# Patient Record
Sex: MALE | Race: WHITE | NOT HISPANIC OR LATINO | Employment: FULL TIME | URBAN - NONMETROPOLITAN AREA
[De-identification: names, ages, dates, MRNs, and addresses within clinical notes are randomized per-mention and may not be internally consistent; named-entity substitution may affect disease eponyms.]

---

## 2024-06-21 ENCOUNTER — APPOINTMENT (OUTPATIENT)
Dept: RADIOLOGY | Facility: CLINIC | Age: 64
End: 2024-06-21
Payer: COMMERCIAL

## 2024-06-21 ENCOUNTER — OFFICE VISIT (OUTPATIENT)
Dept: URGENT CARE | Facility: CLINIC | Age: 64
End: 2024-06-21
Payer: COMMERCIAL

## 2024-06-21 VITALS
DIASTOLIC BLOOD PRESSURE: 78 MMHG | WEIGHT: 315 LBS | HEART RATE: 104 BPM | TEMPERATURE: 97.6 F | RESPIRATION RATE: 20 BRPM | HEIGHT: 75 IN | SYSTOLIC BLOOD PRESSURE: 106 MMHG | BODY MASS INDEX: 39.17 KG/M2 | OXYGEN SATURATION: 96 %

## 2024-06-21 DIAGNOSIS — J40 BRONCHITIS: Primary | ICD-10-CM

## 2024-06-21 DIAGNOSIS — R05.1 ACUTE COUGH: ICD-10-CM

## 2024-06-21 PROCEDURE — 71046 X-RAY EXAM CHEST 2 VIEWS: CPT

## 2024-06-21 PROCEDURE — G0382 LEV 3 HOSP TYPE B ED VISIT: HCPCS

## 2024-06-21 PROCEDURE — S9083 URGENT CARE CENTER GLOBAL: HCPCS

## 2024-06-21 RX ORDER — FLUTICASONE PROPIONATE AND SALMETEROL 100; 50 UG/1; UG/1
1 POWDER RESPIRATORY (INHALATION) 2 TIMES DAILY
COMMUNITY
Start: 2024-06-05

## 2024-06-21 RX ORDER — ASPIRIN 81 MG/1
81 TABLET, CHEWABLE ORAL DAILY
COMMUNITY

## 2024-06-21 RX ORDER — EVOLOCUMAB 140 MG/ML
140 INJECTION, SOLUTION SUBCUTANEOUS
COMMUNITY
Start: 2024-03-14

## 2024-06-21 RX ORDER — NITROGLYCERIN 0.4 MG/1
0.4 TABLET SUBLINGUAL
COMMUNITY
Start: 2024-06-14

## 2024-06-21 RX ORDER — SACUBITRIL AND VALSARTAN 24; 26 MG/1; MG/1
1 TABLET, FILM COATED ORAL 2 TIMES DAILY
COMMUNITY

## 2024-06-21 RX ORDER — FUROSEMIDE 20 MG/1
20 TABLET ORAL DAILY
COMMUNITY

## 2024-06-21 RX ORDER — AZITHROMYCIN 250 MG/1
TABLET, FILM COATED ORAL
Qty: 6 TABLET | Refills: 0 | Status: SHIPPED | OUTPATIENT
Start: 2024-06-21 | End: 2024-06-25

## 2024-06-21 RX ORDER — ALBUTEROL SULFATE 90 UG/1
2 AEROSOL, METERED RESPIRATORY (INHALATION)
COMMUNITY
Start: 2024-06-05

## 2024-06-21 RX ORDER — METOPROLOL SUCCINATE 25 MG/1
25 TABLET, EXTENDED RELEASE ORAL DAILY
COMMUNITY

## 2024-06-21 RX ORDER — PREDNISONE 10 MG/1
TABLET ORAL
Qty: 21 TABLET | Refills: 0 | Status: SHIPPED | OUTPATIENT
Start: 2024-06-21

## 2024-06-21 NOTE — PROGRESS NOTES
[facility]        NAME: Jaspal Huitron is a 63 y.o. male  : 1960    MRN: [id number]  DATE: 2024  TIME: 3:49 PM    Assessment and Plan   Bronchitis [J40]  1. Bronchitis  XR chest pa & lateral    azithromycin (ZITHROMAX) 250 mg tablet    predniSONE 10 mg tablet        No red flag symptoms on exam.  Discussed red flag symptoms to report to the ER if experiencing.  Discussed blood sugar elevating side effects of prednisone should monitor.  Chest x-ray revealed no acute abnormalities but awaiting official radiology interpretation.    Patient Instructions       Follow up with PCP in 3-5 days.  Proceed to  ER if symptoms worsen.    If tests are performed, our office will contact you with results only if changes need to made to the care plan discussed with you at the visit. [facility]    Chief Complaint     Chief Complaint   Patient presents with   • Cough     Dx with acute bronchitis ; took a 3 day course of prednisone without relief. Left side hurts from coughing. Somewhat productive cough, sinus congestion. SOB with exertion.     Mucinex, Nyquil, tylenol with little relief.          History of Present Illness       63-year-old male with a past medical history of severe obesity, hypertension, ischemic coronary artery disease, heart failure with reduced ejection fraction, dyslipidemia presents with ongoing cough complaints for the last month.  Was diagnosed with acute bronchitis on .  Was put on Delsym, Tessalon Perles, prednisone, Flonase, Claritin, Advair, albuterol without any relief.  Reports his sides hurt from coughing and reports continued nasal congestion, runny nose, shortness of breath with exertion.  Has not taking Mucinex, NyQuil, Tylenol without any relief    Cough  Associated symptoms include postnasal drip and rhinorrhea. Pertinent negatives include no chest pain, chills, ear pain, fever, headaches, myalgias, sore throat,  shortness of breath or wheezing.       Review of Systems   Review of Systems   Constitutional:  Negative for appetite change, chills, fatigue and fever.   HENT:  Positive for congestion, postnasal drip and rhinorrhea. Negative for ear pain, sinus pressure, sinus pain and sore throat.    Respiratory:  Positive for cough. Negative for chest tightness, shortness of breath, wheezing and stridor.    Cardiovascular:  Negative for chest pain and palpitations.   Gastrointestinal:  Negative for abdominal pain, constipation, diarrhea, nausea and vomiting.   Musculoskeletal:  Negative for myalgias.   Neurological:  Negative for dizziness, syncope, light-headedness and headaches.         Current Medications       Current Outpatient Medications:   •  albuterol (Ventolin HFA) 90 mcg/act inhaler, Inhale 2 puffs, Disp: , Rfl:   •  aspirin 81 mg chewable tablet, Chew 81 mg daily, Disp: , Rfl:   •  azithromycin (ZITHROMAX) 250 mg tablet, Take 2 tablets today then 1 tablet daily x 4 days, Disp: 6 tablet, Rfl: 0  •  Empagliflozin (Jardiance) 10 MG TABS tablet, Take 10 mg by mouth every morning, Disp: , Rfl:   •  Evolocumab (Repatha SureClick) 140 MG/ML SOAJ, Inject 140 mg under the skin, Disp: , Rfl:   •  Fluticasone-Salmeterol (Advair) 100-50 mcg/dose inhaler, Inhale 1 puff 2 (two) times a day, Disp: , Rfl:   •  furosemide (LASIX) 20 mg tablet, Take 20 mg by mouth daily, Disp: , Rfl:   •  metoprolol succinate (TOPROL-XL) 25 mg 24 hr tablet, Take 25 mg by mouth daily, Disp: , Rfl:   •  nitroglycerin (NITROSTAT) 0.4 mg SL tablet, Place 0.4 mg under the tongue, Disp: , Rfl:   •  predniSONE 10 mg tablet, Take 6 pills on day 1, take 5 pills on day 2, take 4 pills on day 3, take 3 pills on day 4, take 2 pills on day 5, take 1 pill a day 6., Disp: 21 tablet, Rfl: 0  •  sacubitril-valsartan (Entresto) 24-26 MG TABS, Take 1 tablet by mouth 2 (two) times a day, Disp: , Rfl:     Current Allergies     Allergies as of 06/21/2024   • (No Known  "Allergies)            The following portions of the patient's history were reviewed and updated as appropriate: allergies, current medications, past family history, past medical history, past social history, past surgical history and problem list.     Past Medical History:   Diagnosis Date   • History of blood clots    • Pre-diabetes        Past Surgical History:   Procedure Laterality Date   • CORONARY ANGIOPLASTY WITH STENT PLACEMENT         History reviewed. No pertinent family history.      Medications have been verified.        Objective   /78   Pulse 104   Temp 97.6 °F (36.4 °C)   Resp 20   Ht 6' 3\" (1.905 m)   Wt (!) 180 kg (396 lb)   SpO2 96%   BMI 49.50 kg/m²        Physical Exam     Physical Exam  Vitals and nursing note reviewed.   Constitutional:       General: He is not in acute distress.     Appearance: Normal appearance. He is normal weight. He is not ill-appearing, toxic-appearing or diaphoretic.   HENT:      Nose: Congestion and rhinorrhea present.   Cardiovascular:      Rate and Rhythm: Normal rate and regular rhythm.      Pulses: Normal pulses.      Heart sounds: Normal heart sounds. No murmur heard.     No friction rub. No gallop.   Pulmonary:      Effort: Pulmonary effort is normal. No respiratory distress.      Breath sounds: Normal breath sounds. No stridor. No wheezing, rhonchi or rales.      Comments: Speaking in full sentences without difficulty  Chest:      Chest wall: No tenderness.   Neurological:      Mental Status: He is alert.                   "